# Patient Record
Sex: FEMALE | Race: OTHER | NOT HISPANIC OR LATINO
[De-identification: names, ages, dates, MRNs, and addresses within clinical notes are randomized per-mention and may not be internally consistent; named-entity substitution may affect disease eponyms.]

---

## 2019-02-19 PROBLEM — Z00.00 ENCOUNTER FOR PREVENTIVE HEALTH EXAMINATION: Status: ACTIVE | Noted: 2019-02-19

## 2019-02-21 ENCOUNTER — NON-APPOINTMENT (OUTPATIENT)
Age: 34
End: 2019-02-21

## 2019-02-21 ENCOUNTER — APPOINTMENT (OUTPATIENT)
Dept: CARDIOLOGY | Facility: CLINIC | Age: 34
End: 2019-02-21
Payer: COMMERCIAL

## 2019-02-21 ENCOUNTER — APPOINTMENT (OUTPATIENT)
Dept: NEUROLOGY | Facility: CLINIC | Age: 34
End: 2019-02-21
Payer: COMMERCIAL

## 2019-02-21 ENCOUNTER — APPOINTMENT (OUTPATIENT)
Dept: PAIN MANAGEMENT | Facility: CLINIC | Age: 34
End: 2019-02-21
Payer: COMMERCIAL

## 2019-02-21 VITALS
WEIGHT: 160 LBS | SYSTOLIC BLOOD PRESSURE: 120 MMHG | HEART RATE: 77 BPM | BODY MASS INDEX: 28.35 KG/M2 | DIASTOLIC BLOOD PRESSURE: 70 MMHG | HEIGHT: 63 IN

## 2019-02-21 VITALS
HEIGHT: 63 IN | SYSTOLIC BLOOD PRESSURE: 98 MMHG | BODY MASS INDEX: 28.35 KG/M2 | DIASTOLIC BLOOD PRESSURE: 60 MMHG | WEIGHT: 160 LBS

## 2019-02-21 VITALS
TEMPERATURE: 98.1 F | DIASTOLIC BLOOD PRESSURE: 85 MMHG | SYSTOLIC BLOOD PRESSURE: 117 MMHG | HEART RATE: 75 BPM | BODY MASS INDEX: 28.35 KG/M2 | WEIGHT: 160 LBS | HEIGHT: 63 IN

## 2019-02-21 DIAGNOSIS — G43.909 MIGRAINE, UNSPECIFIED, NOT INTRACTABLE, W/OUT STATUS MIGRAINOSUS: ICD-10-CM

## 2019-02-21 DIAGNOSIS — Z78.9 OTHER SPECIFIED HEALTH STATUS: ICD-10-CM

## 2019-02-21 DIAGNOSIS — Z83.3 FAMILY HISTORY OF DIABETES MELLITUS: ICD-10-CM

## 2019-02-21 DIAGNOSIS — Z72.3 LACK OF PHYSICAL EXERCISE: ICD-10-CM

## 2019-02-21 PROCEDURE — 99204 OFFICE O/P NEW MOD 45 MIN: CPT

## 2019-02-21 NOTE — REASON FOR VISIT
[Initial Visit] : an initial pain management visit [FreeTextEntry2] : referred by Dr. Parnell for evaluation of headache

## 2019-02-21 NOTE — ASSESSMENT
[FreeTextEntry1] : Headache likely postdural puncture refractory to conservative treatments, severely debilitating.  I explained the progression and natural course of postural puncture headaches including conservative treatments that can result in self-resolution, however given debilitating and severity of headache, patient would like to choose interventional treatment.  \par \par Patient will see Dr. Deras regarding continued workup of possible neurologic pathology.  Pending neurologic evaluation,\par \par I will schedule lumbar epidural blood patch.  r/b/a discussed including worsening headache, infection, bleeding, neuropathy, severe back pain\par \par Encouraged hydration and symptomatic treatment\par \par \par The above diagnosis and treatment plan is medically reasonable and necessary based on the patient encounter.\par \par There were no barriers to communication.\par Informed patient that I would be available for any additional questions.\par Patient was instructed to call with any worsening symptoms including severe pain, new numbness/weakness, or changes in the bowel/bladder function. \par \par Discussed role of nsaids in pain management and all relevant risks, if patient is continuing to require after 4 weeks the patient should f/u for alternative treatment. \par \par Instructed patient to maintain pain diary to monitor pain level, mobility, and function.\par \par The referring provider was informed of the above diagnosis and treatment plan.\par \par

## 2019-02-21 NOTE — PHYSICAL EXAM
[UE/LE] : Sensory: Intact in bilateral upper & lower extremities [ALL] : dorsalis pedis, posterior tibial, femoral, popliteal, and radial 2+ and symmetric bilaterally [Cranial Nerves Optic (II)] : visual acuity intact bilaterally,  visual fields full to confrontation, pupils equal round and reactive to light [Cranial Nerves Oculomotor (III)] : extraocular motion intact [Cranial Nerves Trigeminal (V)] : facial sensation intact symmetrically [Cranial Nerves Facial (VII)] : face symmetrical [Cranial Nerves Vestibulocochlear (VIII)] : hearing was intact bilaterally [Cranial Nerves Glossopharyngeal (IX)] : tongue and palate midline [Cranial Nerves Accessory (XI - Cranial And Spinal)] : head turning and shoulder shrug symmetric [Cranial Nerves Hypoglossal (XII)] : there was no tongue deviation with protrusion [Normal] : Normal affect [Spurling] : negative Spurling's Test

## 2019-02-21 NOTE — HISTORY OF PRESENT ILLNESS
[FreeTextEntry1] : Ref: Dr. Burger\par \par 32-year-old female with no significant past medical history who presented early February 2019 with an upper respiratory infection after her son had infection. The patient's history was complicated with the worst headache of her life and she had 3 separate episodes of syncope. She ultimately went to the emergency room where she underwent a head CT which was reportedly normal as well as a spinal tap to rule out meningitis. Patient states that there was no meningitis found and after that patient had severe headaches worse with standing up improved with lying down. She also had an oily yellow discharge from her nose. She denied any blurry vision or stiff neck.

## 2019-02-21 NOTE — HISTORY OF PRESENT ILLNESS
[Headache] : headache [___ days] : [unfilled] day(s) ago [10] : a maximum pain level of 10/10 [Sharp] : sharp [Aching] : aching [Laying] : laying [FreeTextEntry1] : HPI\par \par Ms. IQRA SHAFFER is a 33 year F otherwise healthy with no history of headache disorder, who experienced URI symptoms 2 weeks ago with sinus pain, and headache, started on abx but then discontinued because it was thought to be viral.  She stated that she had some nasal discharge and episode of syncope and was brought to ED 2/16/19 and had LP performed, which was negative for intra cranial pathology including SAH.  However since 2/17/19 she has been unable to stand up because of significant bilateral frontal headache radiating to occiput and neck, + photosensitivity.  Headache is positional, patient is unable to sit/stand up because of the headache and improves with lying supine.  She has taken tylenol with mild relief, but has not trailed any other mediations.  Also complains of mild left otalgia\par \par denies any worsening numbness, weakness, bowel/bladder dysfunction, denies fevers\par  [FreeTextEntry3] : staying in upright position

## 2019-02-21 NOTE — PHYSICAL EXAM
[General Appearance - Well Developed] : well developed [Normal Appearance] : normal appearance [Well Groomed] : well groomed [General Appearance - Well Nourished] : well nourished [No Deformities] : no deformities [Normal Conjunctiva] : the conjunctiva exhibited no abnormalities [Eyelids - No Xanthelasma] : the eyelids demonstrated no xanthelasmas [Normal Oral Mucosa] : normal oral mucosa [No Oral Pallor] : no oral pallor [No Oral Cyanosis] : no oral cyanosis [Normal Jugular Venous A Waves Present] : normal jugular venous A waves present [Normal Jugular Venous V Waves Present] : normal jugular venous V waves present [No Jugular Venous Lopez A Waves] : no jugular venous lopez A waves [Heart Rate And Rhythm] : heart rate and rhythm were normal [Heart Sounds] : normal S1 and S2 [Murmurs] : no murmurs present [Respiration, Rhythm And Depth] : normal respiratory rhythm and effort [Exaggerated Use Of Accessory Muscles For Inspiration] : no accessory muscle use [Auscultation Breath Sounds / Voice Sounds] : lungs were clear to auscultation bilaterally [Abdomen Soft] : soft [Abdomen Tenderness] : non-tender [Abdomen Mass (___ Cm)] : no abdominal mass palpated [Abnormal Walk] : normal gait [Gait - Sufficient For Exercise Testing] : the gait was sufficient for exercise testing [Nail Clubbing] : no clubbing of the fingernails [Cyanosis, Localized] : no localized cyanosis [Petechial Hemorrhages (___cm)] : no petechial hemorrhages [Skin Color & Pigmentation] : normal skin color and pigmentation [] : no rash [No Venous Stasis] : no venous stasis [Skin Lesions] : no skin lesions [No Skin Ulcers] : no skin ulcer [No Xanthoma] : no  xanthoma was observed [Oriented To Time, Place, And Person] : oriented to person, place, and time [Affect] : the affect was normal [Mood] : the mood was normal [No Anxiety] : not feeling anxious [FreeTextEntry1] : moderate headache.

## 2019-02-21 NOTE — ASSESSMENT
[FreeTextEntry1] : 2/21/19 EKG NSR ic RBBB, short [place] - no WPW. \par \par 32-year-old female with recurrent syncope with worst headache of her life.\par Recommend echocardiogram\par Zio patch to evaluate for arrhythmias\par Syncope likely due to vasovagal syncope\par Recommend neurology followup\par We'll contact pain management for blood patch as patient has clinical signs or symptoms of complications of spinal tap.

## 2019-02-22 PROBLEM — G43.909 HEADACHE, MIGRAINE: Status: RESOLVED | Noted: 2019-02-21 | Resolved: 2019-02-22

## 2019-02-22 NOTE — ASSESSMENT
[FreeTextEntry1] : Unclear etiology to the unusual headache she experienced.  No further episodes.  Will do imaging to reassess.\par She also has a post-dural puncture headache- asymptomatic when lying down- encouraged drinking caffeine and water and bed rest. If no relief by Monday- she will see Dr. Spear at [facility] for blood patch.\par RTC two weeks to reassess symptoms.

## 2019-02-22 NOTE — PHYSICAL EXAM
[FreeTextEntry1] : Physical examination \par General: No acute distress, Awake, Alert. \par Fundus: disc margins sharp. \par Neck: no Carotid bruit. \par Cardiovascular: Normal rate, Regular rhythm, No murmur. \par Chest - clear bilaterally\par \par Mental status \par Awake, alert, and oriented to person, time and place, Normal attention span and concentration, Recent and remote memory intact, Language intact, Fund of knowledge intact. \par Cranial Nerves \par II: VFF \par III, IV, VI: PERRL, EOMI. \par V: Facial sensation is normal B/L. \par VII: Facial strength is normal B/L. \par VIII: Gross hearing is intact. \par IX, X: Palate is midline and elevates symmetrically. \par XI: Trapezius normal strength. \par XII: Tongue midline without atrophy or fasciculations. \par \par Motor exam \par Muscle tone - no evidence of rigidity or resistance in all 4 extremities. \par No atrophy or fasciculations \par Muscle Strength: arms and legs, proximal and distal flexors and extensors are normal \par No UE drift.\par \par Reflexes \par All present, normal, and symmetrical. \par \par Coordination \par Finger to nose: Normal. \par Heel to shin: Normal. \par \par Sensory \par Intact sensation to vibration and cold.\par  \par Gait \par Normal including heels, toes, and tandem gait. \par  \par \par

## 2019-02-22 NOTE — HISTORY OF PRESENT ILLNESS
[FreeTextEntry1] : This is a 33 year old right handed woman who is being seen in neurologic consultation for evaluation of headaches.  Patient has no history of severe headaches.  Had URI in early February.  Had some headaches with this.  On Monday 2/11 experienced the worst headache of her life.  Couldn't talk or move- improved by the time she saw her PCP in the afternoon.  She was improved and rested at home.  On 2/16/19- she noted an oily, yellowish discharge from her right nostril- She tried to clean out her nose and had a vasovagal episode where she lost consciousness and fell hitting her head.  She was able to call a 911 once she gained awareness.  Went to ED - CT head was negative.  She had lumbar puncture which was unrevealing.  \par Now she is experiencing a severe headache when she has been upright for 20 minutes - this has been since the lumbar puncture.  When she lies down the headache resolves.  No focal or lateralizing signs with either type of headache.

## 2019-02-23 ENCOUNTER — RESULT REVIEW (OUTPATIENT)
Age: 34
End: 2019-02-23

## 2019-02-25 ENCOUNTER — MESSAGE (OUTPATIENT)
Age: 34
End: 2019-02-25

## 2019-02-27 ENCOUNTER — APPOINTMENT (OUTPATIENT)
Dept: NEUROLOGY | Facility: CLINIC | Age: 34
End: 2019-02-27
Payer: COMMERCIAL

## 2019-02-27 VITALS
WEIGHT: 160 LBS | TEMPERATURE: 97.1 F | HEART RATE: 81 BPM | BODY MASS INDEX: 28.35 KG/M2 | DIASTOLIC BLOOD PRESSURE: 75 MMHG | HEIGHT: 63 IN | SYSTOLIC BLOOD PRESSURE: 112 MMHG

## 2019-02-27 PROCEDURE — 99213 OFFICE O/P EST LOW 20 MIN: CPT

## 2019-02-27 NOTE — HISTORY OF PRESENT ILLNESS
[FreeTextEntry1] : 2/27/19\par She continues to have positional headache after she has been upright for more than 20 minutes.  Fluid and caffeine have not helped.  No lateralizing deficits.  Went to work today and could not stay because headache was very intense. MRI brain is normal.  She was supposed to get a blood patch on Monday but noted to have a fever so it was cancelled.  Saw ENT and she is taking ear drops for right ear infection.\par \par 2/21/19\par This is a 33 year old right handed woman who is being seen in neurologic consultation for evaluation of headaches.  Patient has no history of severe headaches.  Had URI in early February.  Had some headaches with this.  On Monday 2/11 experienced the worst headache of her life.  Couldn't talk or move- improved by the time she saw her PCP in the afternoon.  She was improved and rested at home.  On 2/16/19- she noted an oily, yellowish discharge from her right nostril- She tried to clean out her nose and had a vasovagal episode where she lost consciousness and fell hitting her head.  She was able to call a 911 once she gained awareness.  Went to ED - CT head was negative.  She had lumbar puncture which was unrevealing.  \par Now she is experiencing a severe headache when she has been upright for 20 minutes - this has been since the lumbar puncture.  When she lies down the headache resolves.  No focal or lateralizing signs with either type of headache.

## 2019-02-27 NOTE — REASON FOR VISIT
[Follow-Up: _____] : a [unfilled] follow-up visit [Acute] : an acute visit [FreeTextEntry1] : ER FOLLOW UP FEELS,TIRED,DIZZINESS

## 2019-02-27 NOTE — ASSESSMENT
[FreeTextEntry1] : Unclear etiology to the unusual headache she experienced prior to blood patch.  No further episodes- most likely viral etiology.  MRI brain is normal.\par She continues to have a post-dural puncture headache- asymptomatic when lying down- encouraged drinking caffeine and water and bed rest. Spoke to Dr. Spear- she will be getting a blood patch tomorrow.  \par She will see me as needed.\par

## 2019-02-27 NOTE — PHYSICAL EXAM
[FreeTextEntry1] : Neck: no Carotid bruit. \par Cardiovascular: Normal rate, Regular rhythm, No murmur. \par Chest - clear bilaterally\par \par Mental status \par Awake, alert, and oriented to person, time and place, Normal attention span and concentration, Recent and remote memory intact, Language intact, Fund of knowledge intact. \par Cranial Nerves \par II: VFF \par III, IV, VI: PERRL, EOMI. \par V: Facial sensation is normal B/L. \par VII: Facial strength is normal B/L. \par VIII: Gross hearing is intact. \par IX, X: Palate is midline and elevates symmetrically. \par XI: Trapezius normal strength. \par XII: Tongue midline without atrophy or fasciculations. \par \par Motor exam \par Muscle tone - no evidence of rigidity or resistance in all 4 extremities. \par No atrophy or fasciculations \par Muscle Strength: arms and legs, proximal and distal flexors and extensors are normal \par No UE drift.\par \par Reflexes \par All present, normal, and symmetrical. \par  \par Gait \par Normal including heels, toes, and tandem gait. \par  \par \par

## 2019-02-28 ENCOUNTER — APPOINTMENT (OUTPATIENT)
Dept: PAIN MANAGEMENT | Facility: HOSPITAL | Age: 34
End: 2019-02-28

## 2019-03-12 ENCOUNTER — APPOINTMENT (OUTPATIENT)
Dept: NEUROLOGY | Facility: CLINIC | Age: 34
End: 2019-03-12

## 2019-03-15 ENCOUNTER — APPOINTMENT (OUTPATIENT)
Dept: CARDIOLOGY | Facility: CLINIC | Age: 34
End: 2019-03-15

## 2019-04-08 ENCOUNTER — APPOINTMENT (OUTPATIENT)
Dept: CARDIOLOGY | Facility: CLINIC | Age: 34
End: 2019-04-08
Payer: COMMERCIAL

## 2019-04-08 VITALS
DIASTOLIC BLOOD PRESSURE: 80 MMHG | SYSTOLIC BLOOD PRESSURE: 110 MMHG | HEART RATE: 75 BPM | WEIGHT: 165 LBS | HEIGHT: 63 IN | BODY MASS INDEX: 29.23 KG/M2

## 2019-04-08 PROCEDURE — 99213 OFFICE O/P EST LOW 20 MIN: CPT

## 2019-04-13 ENCOUNTER — OTHER (OUTPATIENT)
Age: 34
End: 2019-04-13

## 2019-04-15 NOTE — HISTORY OF PRESENT ILLNESS
[FreeTextEntry1] : Ref: Dr. Burger\par \par 32-year-old female with no significant past medical history who presented early February 2019 with an upper respiratory infection after her son had infection. The patient's history was complicated with the worst headache of her life and she had 3 separate episodes of syncope. She ultimately went to the emergency room where she underwent a head CT which was reportedly normal as well as a spinal tap to rule out meningitis. Patient states that there was no meningitis found and after that patient had severe headaches worse with standing up improved with lying down. She also had an oily yellow discharge from her nose. She denied any blurry vision or stiff neck.\par \par Since last visit her nasal discharge has improved she denies any blurry vision she does not have any further back pain due to fevers she did not receive a blood patch. Patient did not have her echocardiogram done she did not send in her outpatient cardiac monitor ZIO patch patient denies any chest pain

## 2019-04-15 NOTE — ASSESSMENT
[FreeTextEntry1] : 2/21/19 EKG NSR ic RBBB, short [place] - no WPW. \par \par 32-year-old female with recurrent syncope with worst headache of her life.\par Recommend echocardiogram - pt has yet to do this\par Zio patch to evaluate for arrhythmias - pt has yet to send it back in. \par Syncope likely due to vasovagal syncope - no further episode of syncope. \par Recommend neurology followup\par We'll contact pain management for blood patch as patient has clinical signs or symptoms of complications of spinal tap. - this was deferred due to fevers.

## 2019-04-15 NOTE — PHYSICAL EXAM
[General Appearance - Well Developed] : well developed [Normal Appearance] : normal appearance [Well Groomed] : well groomed [General Appearance - Well Nourished] : well nourished [No Deformities] : no deformities [Normal Conjunctiva] : the conjunctiva exhibited no abnormalities [Eyelids - No Xanthelasma] : the eyelids demonstrated no xanthelasmas [Normal Oral Mucosa] : normal oral mucosa [No Oral Pallor] : no oral pallor [No Oral Cyanosis] : no oral cyanosis [Normal Jugular Venous A Waves Present] : normal jugular venous A waves present [Normal Jugular Venous V Waves Present] : normal jugular venous V waves present [No Jugular Venous Lopez A Waves] : no jugular venous lopez A waves [Respiration, Rhythm And Depth] : normal respiratory rhythm and effort [Exaggerated Use Of Accessory Muscles For Inspiration] : no accessory muscle use [Auscultation Breath Sounds / Voice Sounds] : lungs were clear to auscultation bilaterally [Heart Rate And Rhythm] : heart rate and rhythm were normal [Heart Sounds] : normal S1 and S2 [Murmurs] : no murmurs present [Abdomen Soft] : soft [Abdomen Tenderness] : non-tender [Abdomen Mass (___ Cm)] : no abdominal mass palpated [Abnormal Walk] : normal gait [Gait - Sufficient For Exercise Testing] : the gait was sufficient for exercise testing [Nail Clubbing] : no clubbing of the fingernails [Cyanosis, Localized] : no localized cyanosis [Petechial Hemorrhages (___cm)] : no petechial hemorrhages [Skin Color & Pigmentation] : normal skin color and pigmentation [] : no rash [No Venous Stasis] : no venous stasis [Skin Lesions] : no skin lesions [No Skin Ulcers] : no skin ulcer [No Xanthoma] : no  xanthoma was observed [Oriented To Time, Place, And Person] : oriented to person, place, and time [Affect] : the affect was normal [Mood] : the mood was normal [No Anxiety] : not feeling anxious [FreeTextEntry1] : moderate headache.

## 2019-05-14 ENCOUNTER — APPOINTMENT (OUTPATIENT)
Dept: CARDIOLOGY | Facility: CLINIC | Age: 34
End: 2019-05-14
Payer: COMMERCIAL

## 2019-05-14 VITALS
SYSTOLIC BLOOD PRESSURE: 120 MMHG | WEIGHT: 165 LBS | DIASTOLIC BLOOD PRESSURE: 80 MMHG | BODY MASS INDEX: 29.23 KG/M2 | HEART RATE: 86 BPM | HEIGHT: 63 IN

## 2019-05-14 PROCEDURE — 99213 OFFICE O/P EST LOW 20 MIN: CPT

## 2019-05-14 NOTE — PHYSICAL EXAM
[General Appearance - Well Developed] : well developed [Normal Appearance] : normal appearance [Well Groomed] : well groomed [General Appearance - Well Nourished] : well nourished [No Deformities] : no deformities [Normal Conjunctiva] : the conjunctiva exhibited no abnormalities [Eyelids - No Xanthelasma] : the eyelids demonstrated no xanthelasmas [Normal Oral Mucosa] : normal oral mucosa [No Oral Pallor] : no oral pallor [No Oral Cyanosis] : no oral cyanosis [Normal Jugular Venous A Waves Present] : normal jugular venous A waves present [Normal Jugular Venous V Waves Present] : normal jugular venous V waves present [No Jugular Venous Lopez A Waves] : no jugular venous lopez A waves [Respiration, Rhythm And Depth] : normal respiratory rhythm and effort [Exaggerated Use Of Accessory Muscles For Inspiration] : no accessory muscle use [Heart Rate And Rhythm] : heart rate and rhythm were normal [Auscultation Breath Sounds / Voice Sounds] : lungs were clear to auscultation bilaterally [Murmurs] : no murmurs present [Heart Sounds] : normal S1 and S2 [Abdomen Soft] : soft [Abdomen Tenderness] : non-tender [Abdomen Mass (___ Cm)] : no abdominal mass palpated [Gait - Sufficient For Exercise Testing] : the gait was sufficient for exercise testing [Abnormal Walk] : normal gait [Cyanosis, Localized] : no localized cyanosis [Nail Clubbing] : no clubbing of the fingernails [Petechial Hemorrhages (___cm)] : no petechial hemorrhages [Skin Color & Pigmentation] : normal skin color and pigmentation [] : no rash [No Venous Stasis] : no venous stasis [Skin Lesions] : no skin lesions [No Skin Ulcers] : no skin ulcer [No Xanthoma] : no  xanthoma was observed [Oriented To Time, Place, And Person] : oriented to person, place, and time [Affect] : the affect was normal [No Anxiety] : not feeling anxious [Mood] : the mood was normal [FreeTextEntry1] : moderate headache.

## 2019-05-14 NOTE — HISTORY OF PRESENT ILLNESS
[FreeTextEntry1] : Ref: Dr. Burger\par \par 32-year-old female with no significant past medical history who presented early February 2019 with an upper respiratory infection after her son had infection. The patient's history was complicated with the worst headache of her life and she had 3 separate episodes of syncope. She ultimately went to the emergency room where she underwent a head CT which was reportedly normal as well as a spinal tap to rule out meningitis. Patient states that there was no meningitis found and after that patient had severe headaches worse with standing up improved with lying down. She also had an oily yellow discharge from her nose. She denied any blurry vision or stiff neck.\par \par  her nasal discharge has improved she denies any blurry vision she does not have any further back pain due to fevers she did not receive a blood patch. Patient did not have her echocardiogram done she did not send in her outpatient cardiac monitor ZIO patch patient denies any chest pain\par \par Since last visit - no further syncope.

## 2019-05-14 NOTE — ASSESSMENT
[FreeTextEntry1] : 2/21/19 EKG NSR ic RBBB, short [place] - no WPW. \par \par 32-year-old female with recurrent syncope with worst headache of her life. No recent syncope\par echocardiogram - Ef normal Ejection fraction 68%\par Zio patch to evaluate for arrhythmias - pt has yet to send it back in. \par Syncope likely due to vasovagal syncope - no further episode of syncope. \par Recommend neurology followup\par blood patch deferred due to fevers. \par pt may follow up prn. \par pt to call in for results of zio.

## 2019-07-03 ENCOUNTER — APPOINTMENT (OUTPATIENT)
Dept: CARDIOLOGY | Facility: CLINIC | Age: 34
End: 2019-07-03
Payer: COMMERCIAL

## 2019-07-03 ENCOUNTER — NON-APPOINTMENT (OUTPATIENT)
Age: 34
End: 2019-07-03

## 2019-07-03 VITALS — DIASTOLIC BLOOD PRESSURE: 60 MMHG | HEART RATE: 80 BPM | SYSTOLIC BLOOD PRESSURE: 94 MMHG

## 2019-07-03 PROCEDURE — 99213 OFFICE O/P EST LOW 20 MIN: CPT

## 2019-07-03 PROCEDURE — 93000 ELECTROCARDIOGRAM COMPLETE: CPT

## 2019-07-03 RX ORDER — PROCHLORPERAZINE MALEATE 5 MG/1
5 TABLET ORAL
Qty: 12 | Refills: 0 | Status: DISCONTINUED | COMMUNITY
Start: 2019-02-19

## 2019-07-03 RX ORDER — CLINDAMYCIN PHOSPHATE 20 MG/G
2 CREAM VAGINAL
Qty: 40 | Refills: 0 | Status: DISCONTINUED | COMMUNITY
Start: 2019-01-14

## 2019-07-03 RX ORDER — DOXYCYCLINE HYCLATE 100 MG/1
100 TABLET ORAL
Qty: 20 | Refills: 0 | Status: DISCONTINUED | COMMUNITY
Start: 2019-02-11

## 2019-07-03 RX ORDER — FLUTICASONE PROPIONATE 50 UG/1
50 SPRAY, METERED NASAL
Qty: 16 | Refills: 0 | Status: DISCONTINUED | COMMUNITY
Start: 2019-02-11

## 2019-07-03 RX ORDER — FLUCONAZOLE 150 MG/1
150 TABLET ORAL
Qty: 2 | Refills: 0 | Status: DISCONTINUED | COMMUNITY
Start: 2019-01-14

## 2019-07-03 NOTE — REASON FOR VISIT
[FreeTextEntry1] : Called office with c/o intermittent chest discomfort over last few days, a non exertional heaviness that lasted a few hours when she had it.  Pain was relieved after taking Aleve yesterday, today there has been no pain.  She presents for evaluation\par \par History of syncope- none recent

## 2019-07-03 NOTE — REVIEW OF SYSTEMS
[Negative] : Musculoskeletal [Recent Weight Gain (___ Lbs)] : no recent weight gain [Recent Weight Loss (___ Lbs)] : no recent weight loss [Shortness Of Breath] : no shortness of breath [Dyspnea on exertion] : not dyspnea during exertion [Lower Ext Edema] : no extremity edema [Cough] : no cough [Palpitations] : no palpitations [Easy Bruising] : no tendency for easy bruising [Easy Bleeding] : no tendency for easy bleeding

## 2019-07-03 NOTE — HISTORY OF PRESENT ILLNESS
[FreeTextEntry1] : \par \par 32-year-old female with no significant past medical history who presented early February 2019 with an upper respiratory infection after her son had infection. The patient's history was complicated with the worst headache of her life and she had 3 separate episodes of syncope. She ultimately went to the emergency room where she underwent a head CT which was reportedly normal as well as a spinal tap to rule out meningitis. Patient states that there was no meningitis found and after that patient had severe headaches worse with standing up improved with lying down. She also had an oily yellow discharge from her nose. She denied any blurry vision or stiff neck.\par her nasal discharge has improved she denies any blurry vision she does not have any further back pain

## 2019-07-03 NOTE — PHYSICAL EXAM
[General Appearance - In No Acute Distress] : no acute distress [Normal Appearance] : normal appearance [Auscultation Breath Sounds / Voice Sounds] : lungs were clear to auscultation bilaterally [Respiration, Rhythm And Depth] : normal respiratory rhythm and effort [Heart Sounds] : normal S1 and S2 [Edema] : no peripheral edema present [Skin Color & Pigmentation] : normal skin color and pigmentation [Abnormal Walk] : normal gait

## 2019-07-03 NOTE — ASSESSMENT
[FreeTextEntry1] : atypical chest discomfort, relieved with Aleve.  Doubtful cardiac in nature.  Patient reassured.  If reoccurs can schedule stress test

## 2019-10-03 ENCOUNTER — APPOINTMENT (OUTPATIENT)
Dept: OBGYN | Facility: CLINIC | Age: 34
End: 2019-10-03

## 2020-10-02 ENCOUNTER — APPOINTMENT (OUTPATIENT)
Dept: GASTROENTEROLOGY | Facility: CLINIC | Age: 35
End: 2020-10-02
Payer: COMMERCIAL

## 2020-10-02 VITALS
HEART RATE: 72 BPM | HEIGHT: 63 IN | WEIGHT: 190 LBS | OXYGEN SATURATION: 98 % | TEMPERATURE: 99 F | SYSTOLIC BLOOD PRESSURE: 110 MMHG | BODY MASS INDEX: 33.66 KG/M2 | DIASTOLIC BLOOD PRESSURE: 60 MMHG

## 2020-10-02 PROCEDURE — 99203 OFFICE O/P NEW LOW 30 MIN: CPT

## 2020-10-02 NOTE — HISTORY OF PRESENT ILLNESS
[FreeTextEntry1] : 35f no sig PMH presents for diarrhea that started 4 wks ago- originally loose, 3-4x/d.  Nonbloody, no fever, mild lower abd pains relieved by BMs.  Had stools tested and reportedly neg for infx; took several days of cipro - slowly sx have improved.  Pt states stool more formed now, but soft, yellower in color, once daily.  Pain resolved.  No other abx, travel.  \par \par Soc:  no tobacco or significant EtOH\par FHx: no FHx GI malignancy or IBD\par \par ROS:\par Constitutional:: no weight loss, fevers\par ENT: no deafness\par Eyes: not blind\par Neck: no LN\par Chest: no dyspnea/cough\par Cardiac: no chest pain\par Vascular: no leg swelling\par GI: no abdominal pain, nausea, vomiting, diarrhea, constipation, rectal bleeding, dysphagia, melena unless otherwise noted in HPI\par : no dysuria, dark urine\par Skin: no rashes, jaundice\par Heme: no bleeding\par Endocrine: no DM unless otherwise stated in HPI\par \par Px: (VS noted below)\par General: NAD\par Eyes: anicteric\par Oropharynx:  clear\par Neck: no LN\par Chest: normal respiratory effort\par CVS: regular\par Abd: soft, NT, ND, +BS, no HSM\par Ext: no atrophy\par Neuro: grossly nonfocal\par \par Labs/imaging/prior endoscopic results reviewed to the extent available and noted in HPI\par

## 2020-10-02 NOTE — CONSULT LETTER
[FreeTextEntry1] : Dear Dr. MINOR MAY ,\par \par I had the pleasure of evaluating your patient,  IQRA SHAFEFR.\par \par Please refer to my note below.\par \par Thank you very much for allowing me to participate in the care of this patient.  If you have any questions, please do not hesitate to contact me.\par \par Sincerely, \par \par Krystian Funk MD\par

## 2020-10-02 NOTE — ASSESSMENT
[FreeTextEntry1] : seems to be resolving - possibly viral, w/ some post-infx ibs or lactose intolerance?  Seems to be nearly back to baseline.  Will defer testing at this time.  Advised pt to call if sx worsen again or if any sx persist >2-3 more weeks.  Perhaps low lactose diet for now.

## 2020-10-02 NOTE — REASON FOR VISIT
[Consultation] : a consultation visit [FreeTextEntry1] : Kindly asked by MEMO SMITH NP  to consult and evaluate patient for  diarrhea                  \par A copy of this note is being sent to physician requesting consultation.

## 2022-06-16 ENCOUNTER — APPOINTMENT (OUTPATIENT)
Dept: NEUROLOGY | Facility: CLINIC | Age: 37
End: 2022-06-16

## 2022-06-20 ENCOUNTER — TRANSCRIPTION ENCOUNTER (OUTPATIENT)
Age: 37
End: 2022-06-20

## 2022-07-08 ENCOUNTER — APPOINTMENT (OUTPATIENT)
Dept: NEUROLOGY | Facility: CLINIC | Age: 37
End: 2022-07-08

## 2022-09-26 ENCOUNTER — APPOINTMENT (OUTPATIENT)
Dept: CARDIOLOGY | Facility: CLINIC | Age: 37
End: 2022-09-26

## 2022-09-26 ENCOUNTER — NON-APPOINTMENT (OUTPATIENT)
Age: 37
End: 2022-09-26

## 2022-09-26 VITALS
OXYGEN SATURATION: 100 % | HEIGHT: 63 IN | RESPIRATION RATE: 12 BRPM | SYSTOLIC BLOOD PRESSURE: 113 MMHG | DIASTOLIC BLOOD PRESSURE: 77 MMHG | HEART RATE: 81 BPM | WEIGHT: 206 LBS | BODY MASS INDEX: 36.5 KG/M2

## 2022-09-26 DIAGNOSIS — G44.53 PRIMARY THUNDERCLAP HEADACHE: ICD-10-CM

## 2022-09-26 DIAGNOSIS — M79.18 MYALGIA, OTHER SITE: ICD-10-CM

## 2022-09-26 DIAGNOSIS — Z87.898 PERSONAL HISTORY OF OTHER SPECIFIED CONDITIONS: ICD-10-CM

## 2022-09-26 DIAGNOSIS — R07.89 OTHER CHEST PAIN: ICD-10-CM

## 2022-09-26 DIAGNOSIS — G97.1 OTHER REACTION TO SPINAL AND LUMBAR PUNCTURE: ICD-10-CM

## 2022-09-26 PROCEDURE — 93246 EXT ECG>7D<15D RECORDING: CPT

## 2022-09-26 PROCEDURE — 99204 OFFICE O/P NEW MOD 45 MIN: CPT

## 2022-09-26 NOTE — PHYSICAL EXAM
[No Acute Distress] : no acute distress [Obese] : obese [Normal Conjunctiva] : normal conjunctiva [Normal Venous Pressure] : normal venous pressure [No Carotid Bruit] : no carotid bruit [Normal S1, S2] : normal S1, S2 [No Murmur] : no murmur [No Rub] : no rub [No Gallop] : no gallop [Clear Lung Fields] : clear lung fields [Good Air Entry] : good air entry [No Respiratory Distress] : no respiratory distress  [No Masses/organomegaly] : no masses/organomegaly [Normal Bowel Sounds] : normal bowel sounds [No Edema] : no edema [No Cyanosis] : no cyanosis [No Clubbing] : no clubbing [Moves all extremities] : moves all extremities [No Focal Deficits] : no focal deficits [Normal Speech] : normal speech [Alert and Oriented] : alert and oriented [Normal memory] : normal memory

## 2022-09-26 NOTE — HISTORY OF PRESENT ILLNESS
[FreeTextEntry1] : 36 yo female who presents today for cardiac evaluation of recurrent dizziness/syncope. She was recently evaluated at [facility] on 9/14/22 for dizziness which occurred when getting up after sitting down for ~ 10 minutes. ECG and labs in ER were unremarkable except for Hgb 10. She reports having syncope in 5/2022 while she was pregnant with twins. She has been having these episodes/symptoms over the years and underwent echo in 2019, which was unremarkable study. Patient denies chest pain, dyspnea, palpitations, edema, melena, hematochezia, or hematemesis. She reports having syncope in the past from seeing blood after cutting her finger. She also reports that she probably does not drink enough fluids throughout the day.\par \par Primary: Orlando Burger ([facility])

## 2022-09-26 NOTE — ASSESSMENT
[FreeTextEntry1] : 36 yo female with recurrent dizziness/syncope. \par ECG and labs (CBC, CMP) from [facility] on 9/14/22 were reviewed today and found to be unremarkable except for anemia with Hgb 10.\par Brain MRI report from 2/2022 was reviewed and negative for acute intracranial findings.\par Echo from 4/30/19 reviewed today and reported normal LV size and systolic function, LVEF 68%, and trace MR\par \par Zio XT monitor was placed in the office today to monitor for arrhythmias over the next 2 weeks.\par Pending review of monitor results, will determine if further cardiac work-up or intervention is clinically indicated.\par Suspect that patient is having vasovagal syncope +/- orthostatic component.\par Patient was advised to try to avoid potential triggers for vasovagal syncope and to also ensure adequate fluid intake.

## 2022-09-26 NOTE — CARDIOLOGY SUMMARY
[de-identified] : \par 9/14/22 ECG (at [facility]): Sinus, rate 65 bpm, incomplete RBBB\par  [de-identified] : \par 4/30/19 Echo (at [facility]): Normal LV size and systolic function, LVEF 68%. Trace MR\par

## 2022-10-26 DIAGNOSIS — R42 DIZZINESS AND GIDDINESS: ICD-10-CM

## 2022-10-26 DIAGNOSIS — R55 SYNCOPE AND COLLAPSE: ICD-10-CM

## 2022-10-26 PROCEDURE — 93248 EXT ECG>7D<15D REV&INTERPJ: CPT

## 2022-10-27 ENCOUNTER — NON-APPOINTMENT (OUTPATIENT)
Age: 37
End: 2022-10-27

## 2023-05-31 ENCOUNTER — APPOINTMENT (OUTPATIENT)
Dept: OBGYN | Facility: CLINIC | Age: 38
End: 2023-05-31
Payer: MEDICAID

## 2023-05-31 VITALS
SYSTOLIC BLOOD PRESSURE: 120 MMHG | WEIGHT: 227 LBS | BODY MASS INDEX: 40.22 KG/M2 | HEIGHT: 63 IN | DIASTOLIC BLOOD PRESSURE: 80 MMHG

## 2023-05-31 PROCEDURE — 76857 US EXAM PELVIC LIMITED: CPT

## 2023-05-31 PROCEDURE — 99214 OFFICE O/P EST MOD 30 MIN: CPT

## 2023-05-31 RX ADMIN — METHYLERGONOVINE MALEATE 0.2 MG/ML: 0.2 INJECTION INTRAVENOUS at 00:00

## 2023-05-31 RX ADMIN — KETOROLAC TROMETHAMINE 0 MG/ML: 30 INJECTION, SOLUTION INTRAMUSCULAR; INTRAVENOUS at 00:00

## 2023-05-31 NOTE — COUNSELING
[Nutrition] : nutrition [Vulvar Hygiene] : vulvar hygiene [Pre/Post Op Instructions] : pre/post op instructions

## 2023-05-31 NOTE — PHYSICAL EXAM
[Chaperone Present] : A chaperone was present in the examining room during all aspects of the physical examination [FreeTextEntry1] : Marie Rangel and Rylee Cook [Labia Majora] : labia major [Normal] : clitoris [Moderate] : there was moderate vaginal bleeding [Normal Position] : in a normal position [Tenderness] : nontender [Enlarged ___ wks] : enlarged [unfilled] ~Uweeks [Uterine Adnexae] : were not tender and not enlarged [FreeTextEntry5] : tissue pulled from the os and sent to pathology

## 2023-05-31 NOTE — CHIEF COMPLAINT
[Urgent Visit] : Urgent Visit [FreeTextEntry1] : 38 yr old @ 8 weeks amenorrheic was seen in the ER 2023 and diagnosed with a missed .  Patient was given 800mcg Cytotec and sent home to follow up in the office.  Patient states she had some bleeding but not much.\par \par RH- positive\par \par Sono 23\par Uterus:\par gestational sac containing a yolk sac is noted in the lower uterine segment/cervix. Fetal pole is no longer identified.\par \par Right ovary: 2.8 x 2.5 x 2.5 cm. Within normal limits.\par Left ovary: Not visualized. \par Free fluid:None.\par \par \par IMPRESSION: Findings consistent with nonviable gestation.\par

## 2023-05-31 NOTE — PROCEDURE
[F/U Medical ] : f/u medical  [Transvaginal Ultrasound] : transvaginal ultrasound [L: ___ cm] : L: [unfilled] cm [W: ___cm] : W: [unfilled] cm [H: ___ cm] : H: [unfilled] cm [Not Visualized] : not visualized [FreeTextEntry5] : ES 11.7mm

## 2023-05-31 NOTE — HISTORY OF PRESENT ILLNESS
[Good] : being in good health [Reproductive Age] : is of reproductive age [Lower-Rt-Q] : lower right quadrant [Suprapubic] : suprapubic area [Lower-Lt-Q] : left lower quadrant [Sexually Active] : is sexually active [Male ___] : [unfilled] male

## 2023-06-06 RX ORDER — KETOROLAC TROMETHAMINE 30 MG/ML
30 INJECTION, SOLUTION INTRAMUSCULAR; INTRAVENOUS
Qty: 1 | Refills: 0 | Status: COMPLETED | OUTPATIENT
Start: 2023-05-31

## 2023-06-06 RX ORDER — METHYLERGONOVINE MALEATE 0.2 MG/ML
0.2 INJECTION, SOLUTION INTRAMUSCULAR; INTRAVENOUS
Qty: 0 | Refills: 0 | Status: COMPLETED | OUTPATIENT
Start: 2023-05-31

## 2023-06-07 ENCOUNTER — APPOINTMENT (OUTPATIENT)
Dept: OBGYN | Facility: CLINIC | Age: 38
End: 2023-06-07
Payer: MEDICAID

## 2023-06-07 ENCOUNTER — ASOB RESULT (OUTPATIENT)
Age: 38
End: 2023-06-07

## 2023-06-07 VITALS
WEIGHT: 220 LBS | BODY MASS INDEX: 38.98 KG/M2 | DIASTOLIC BLOOD PRESSURE: 78 MMHG | HEIGHT: 63 IN | SYSTOLIC BLOOD PRESSURE: 118 MMHG

## 2023-06-07 PROCEDURE — 76830 TRANSVAGINAL US NON-OB: CPT

## 2023-06-07 PROCEDURE — 99213 OFFICE O/P EST LOW 20 MIN: CPT

## 2023-06-07 NOTE — HISTORY OF PRESENT ILLNESS
[FreeTextEntry1] : 38 yr old presents for follow up after missed  1 week ago.  She was given methergine but was not able to complete the course.  Today she has scant bleeding.  Sono shows a thickened endometrial stripe.

## 2023-06-07 NOTE — PLAN
[FreeTextEntry1] : Patient to take another course of Cytotec\par Follow up in 1 week\par Expect heavier bleeding

## 2023-06-14 ENCOUNTER — ASOB RESULT (OUTPATIENT)
Age: 38
End: 2023-06-14

## 2023-06-14 ENCOUNTER — APPOINTMENT (OUTPATIENT)
Dept: OBGYN | Facility: CLINIC | Age: 38
End: 2023-06-14
Payer: MEDICAID

## 2023-06-14 VITALS
SYSTOLIC BLOOD PRESSURE: 110 MMHG | WEIGHT: 218 LBS | DIASTOLIC BLOOD PRESSURE: 70 MMHG | HEIGHT: 63 IN | BODY MASS INDEX: 38.62 KG/M2

## 2023-06-14 LAB — CORE LAB BIOPSY: NORMAL

## 2023-06-14 PROCEDURE — 76830 TRANSVAGINAL US NON-OB: CPT

## 2023-06-14 PROCEDURE — 99213 OFFICE O/P EST LOW 20 MIN: CPT

## 2023-06-14 NOTE — HISTORY OF PRESENT ILLNESS
[FreeTextEntry1] : 38 yr old presents today for follow up.  She had minimal bleeding with the last doses of Cytotec.  Patient is doing well and states the bleeding has stopped.  She is leaving tomorrow to go on vacation with her family and will be back next Friday/

## 2023-06-14 NOTE — PLAN
[FreeTextEntry1] : Review of sono today still shows a thickened lining.  \par Discussed with the patient doing a D&C\par She would like to wait until she returns from vacation\par Will plan for D&C on 6/30/23

## 2023-06-29 ENCOUNTER — APPOINTMENT (OUTPATIENT)
Age: 38
End: 2023-06-29
Payer: MEDICAID

## 2023-06-29 ENCOUNTER — ASOB RESULT (OUTPATIENT)
Age: 38
End: 2023-06-29

## 2023-06-29 ENCOUNTER — APPOINTMENT (OUTPATIENT)
Dept: OBGYN | Facility: CLINIC | Age: 38
End: 2023-06-29
Payer: MEDICAID

## 2023-06-29 VITALS
DIASTOLIC BLOOD PRESSURE: 70 MMHG | WEIGHT: 218 LBS | BODY MASS INDEX: 38.62 KG/M2 | HEIGHT: 63 IN | SYSTOLIC BLOOD PRESSURE: 120 MMHG

## 2023-06-29 DIAGNOSIS — O02.1 MISSED ABORTION: ICD-10-CM

## 2023-06-29 PROCEDURE — 76830 TRANSVAGINAL US NON-OB: CPT

## 2023-06-29 PROCEDURE — 99213 OFFICE O/P EST LOW 20 MIN: CPT

## 2023-06-29 NOTE — PHYSICAL EXAM
[Appropriately responsive] : appropriately responsive [Alert] : alert [No Acute Distress] : no acute distress [No Murmurs] : no murmurs [Soft] : soft [Non-tender] : non-tender [Oriented x3] : oriented x3

## 2023-06-29 NOTE — HISTORY OF PRESENT ILLNESS
[FreeTextEntry1] : 38-year-old multiparous patient who was seen as a follow-up from a ER visit for missed .  Patient was seen in the office as an urgent visit on May 31, 2023 at which time patient's had products at the os and these products were removed patient was given Methergine and a 1 week follow up.  On  she presented  for a follow up.  She states she was not able to take the medication and there were retained products seen and in the uterus,  a  dose of Cytotec was administered with a follow-up on the  .   visit the patient states she passed clots and tissue and ultrasound showed a thickened endometrium patient was advised that she needed to go to the operating room for a D&C.  She wanted to wait until coming back from vacation for the D&C, scheduled for 2023.   Patient called the office today stating that she had a period that started on Friday of last week and  she had heavy bleeding for 4 days.  The period ended today.  Patient advised to come in for evaluation, she is currently just spotting and denies other complaints.

## 2023-06-29 NOTE — COUNSELING
[Nutrition/ Exercise/ Weight Management] : nutrition, exercise, weight management [Contraception/ Emergency Contraception/ Safe Sexual Practices] : contraception, emergency contraception, safe sexual practices [Other ___] : [unfilled]

## 2023-06-30 ENCOUNTER — APPOINTMENT (OUTPATIENT)
Age: 38
End: 2023-06-30

## 2023-07-03 ENCOUNTER — APPOINTMENT (OUTPATIENT)
Age: 38
End: 2023-07-03

## 2023-07-06 LAB
ABO + RH PNL BLD: NORMAL
HCG SERPL-MCNC: 13 MIU/ML
HCG SERPL-MCNC: 2 MIU/ML

## 2023-10-02 ENCOUNTER — APPOINTMENT (OUTPATIENT)
Age: 38
End: 2023-10-02

## 2024-04-17 ENCOUNTER — LABORATORY RESULT (OUTPATIENT)
Age: 39
End: 2024-04-17

## 2024-04-18 ENCOUNTER — NON-APPOINTMENT (OUTPATIENT)
Age: 39
End: 2024-04-18

## 2024-04-18 ENCOUNTER — APPOINTMENT (OUTPATIENT)
Dept: HEART AND VASCULAR | Facility: CLINIC | Age: 39
End: 2024-04-18
Payer: MEDICAID

## 2024-04-18 VITALS
SYSTOLIC BLOOD PRESSURE: 120 MMHG | DIASTOLIC BLOOD PRESSURE: 78 MMHG | BODY MASS INDEX: 41.11 KG/M2 | WEIGHT: 232 LBS | HEIGHT: 63 IN

## 2024-04-18 DIAGNOSIS — R55 SYNCOPE AND COLLAPSE: ICD-10-CM

## 2024-04-18 DIAGNOSIS — R07.9 CHEST PAIN, UNSPECIFIED: ICD-10-CM

## 2024-04-18 PROCEDURE — 99214 OFFICE O/P EST MOD 30 MIN: CPT | Mod: 25

## 2024-04-18 PROCEDURE — 93000 ELECTROCARDIOGRAM COMPLETE: CPT

## 2024-04-18 RX ORDER — CHLORHEXIDINE GLUCONATE 4 %
325 (65 FE) LIQUID (ML) TOPICAL
Qty: 60 | Refills: 0 | Status: DISCONTINUED | COMMUNITY
Start: 2022-07-29 | End: 2024-04-18

## 2024-04-18 RX ORDER — MISOPROSTOL 200 UG/1
200 TABLET ORAL 3 TIMES DAILY
Qty: 12 | Refills: 0 | Status: DISCONTINUED | COMMUNITY
Start: 2023-06-07 | End: 2024-04-18

## 2024-04-18 RX ORDER — METHYLERGONOVINE MALEATE 0.2 MG/ML
0.2 INJECTION, SOLUTION INTRAMUSCULAR; INTRAVENOUS ONCE
Qty: 1 | Refills: 0 | Status: DISCONTINUED | COMMUNITY
Start: 2023-05-31 | End: 2024-04-18

## 2024-04-18 RX ORDER — METHYLERGONOVINE MALEATE 0.2 MG/1
0.2 TABLET ORAL 3 TIMES DAILY
Qty: 9 | Refills: 0 | Status: DISCONTINUED | COMMUNITY
Start: 2023-05-31 | End: 2024-04-18

## 2024-04-18 NOTE — HISTORY OF PRESENT ILLNESS
[FreeTextEntry1] : 37 YO F with no PMH, no family Hx of early CAD/stroke, Hx of near syncope likely vagal/orthostatic with a _ve Holter/TTE. Smokes marijuana daily, who is here for Lt arm and chest pain with some SOB. No MARTINEZ/palpitations. EKG is incomplete RBBB and unchanged.

## 2024-04-18 NOTE — DISCUSSION/SUMMARY
[FreeTextEntry1] : 37 YO F with no PMH, no family Hx of early CAD/stroke, Hx of near syncope likely vagal/orthostatic with a _ve Holter/TTE. Smokes marijuana daily, who is here for Lt arm and chest pain with some SOB. No MARTINEZ/palpitations. EKG is incomplete RBBB and unchanged.  TTE Stress echo  Lipid panel  Will check the calcium score depending on the lipids   The syncope episode could be due to vagal/orthostatic/marijuana use. _ve Holter before an no palpitations  [EKG obtained to assist in diagnosis and management of assessed problem(s)] : EKG obtained to assist in diagnosis and management of assessed problem(s)

## 2024-04-19 LAB
CHOLEST SERPL-MCNC: 110 MG/DL
HDLC SERPL-MCNC: 46 MG/DL
LDLC SERPL CALC-MCNC: 48 MG/DL
NONHDLC SERPL-MCNC: 64 MG/DL
TRIGL SERPL-MCNC: 74 MG/DL

## 2024-05-09 ENCOUNTER — APPOINTMENT (OUTPATIENT)
Dept: NEUROLOGY | Facility: CLINIC | Age: 39
End: 2024-05-09

## 2024-06-24 ENCOUNTER — APPOINTMENT (OUTPATIENT)
Dept: CARDIOLOGY | Facility: CLINIC | Age: 39
End: 2024-06-24

## 2024-06-24 ENCOUNTER — APPOINTMENT (OUTPATIENT)
Dept: CARDIOLOGY | Facility: CLINIC | Age: 39
End: 2024-06-24
Payer: MEDICAID

## 2024-06-24 PROCEDURE — 93015 CV STRESS TEST SUPVJ I&R: CPT

## 2024-06-24 PROCEDURE — 93306 TTE W/DOPPLER COMPLETE: CPT

## 2024-07-11 ENCOUNTER — NON-APPOINTMENT (OUTPATIENT)
Age: 39
End: 2024-07-11

## 2024-07-11 ENCOUNTER — APPOINTMENT (OUTPATIENT)
Dept: HEART AND VASCULAR | Facility: CLINIC | Age: 39
End: 2024-07-11
Payer: MEDICAID

## 2024-07-11 VITALS
HEART RATE: 89 BPM | DIASTOLIC BLOOD PRESSURE: 60 MMHG | SYSTOLIC BLOOD PRESSURE: 92 MMHG | HEIGHT: 63 IN | WEIGHT: 229 LBS | BODY MASS INDEX: 40.57 KG/M2 | OXYGEN SATURATION: 96 %

## 2024-07-11 DIAGNOSIS — R06.02 SHORTNESS OF BREATH: ICD-10-CM

## 2024-07-11 PROCEDURE — 93000 ELECTROCARDIOGRAM COMPLETE: CPT

## 2024-07-11 PROCEDURE — 99214 OFFICE O/P EST MOD 30 MIN: CPT | Mod: 25

## 2024-08-23 ENCOUNTER — APPOINTMENT (OUTPATIENT)
Dept: CARDIOLOGY | Facility: CLINIC | Age: 39
End: 2024-08-23

## 2024-08-29 ENCOUNTER — APPOINTMENT (OUTPATIENT)
Dept: HEART AND VASCULAR | Facility: CLINIC | Age: 39
End: 2024-08-29

## 2024-10-02 ENCOUNTER — APPOINTMENT (OUTPATIENT)
Dept: CARDIOLOGY | Facility: CLINIC | Age: 39
End: 2024-10-02
Payer: MEDICAID

## 2024-10-02 PROCEDURE — 93351 STRESS TTE COMPLETE: CPT

## 2024-10-02 PROCEDURE — ZZZZZ: CPT | Mod: NC

## 2024-10-16 ENCOUNTER — APPOINTMENT (OUTPATIENT)
Dept: OBGYN | Facility: CLINIC | Age: 39
End: 2024-10-16
Payer: MEDICAID

## 2024-10-16 ENCOUNTER — ASOB RESULT (OUTPATIENT)
Age: 39
End: 2024-10-16

## 2024-10-16 VITALS
SYSTOLIC BLOOD PRESSURE: 120 MMHG | BODY MASS INDEX: 39.69 KG/M2 | DIASTOLIC BLOOD PRESSURE: 70 MMHG | WEIGHT: 224 LBS | HEIGHT: 63 IN

## 2024-10-16 DIAGNOSIS — R10.2 PELVIC AND PERINEAL PAIN: ICD-10-CM

## 2024-10-16 DIAGNOSIS — N83.209 UNSPECIFIED OVARIAN CYST, UNSPECIFIED SIDE: ICD-10-CM

## 2024-10-16 PROCEDURE — 99214 OFFICE O/P EST MOD 30 MIN: CPT

## 2024-10-16 PROCEDURE — 99459 PELVIC EXAMINATION: CPT

## 2024-10-16 PROCEDURE — 76830 TRANSVAGINAL US NON-OB: CPT

## 2024-10-25 ENCOUNTER — RESULT REVIEW (OUTPATIENT)
Age: 39
End: 2024-10-25

## 2024-12-06 DIAGNOSIS — R94.39 ABNORMAL RESULT OF OTHER CARDIOVASCULAR FUNCTION STUDY: ICD-10-CM

## 2024-12-13 ENCOUNTER — APPOINTMENT (OUTPATIENT)
Dept: HEART AND VASCULAR | Facility: CLINIC | Age: 39
End: 2024-12-13
Payer: MEDICAID

## 2024-12-13 ENCOUNTER — NON-APPOINTMENT (OUTPATIENT)
Age: 39
End: 2024-12-13

## 2024-12-13 VITALS
DIASTOLIC BLOOD PRESSURE: 82 MMHG | HEART RATE: 88 BPM | OXYGEN SATURATION: 98 % | WEIGHT: 228 LBS | BODY MASS INDEX: 40.4 KG/M2 | HEIGHT: 63 IN | SYSTOLIC BLOOD PRESSURE: 120 MMHG

## 2024-12-13 DIAGNOSIS — E78.5 HYPERLIPIDEMIA, UNSPECIFIED: ICD-10-CM

## 2024-12-13 PROCEDURE — 93000 ELECTROCARDIOGRAM COMPLETE: CPT

## 2024-12-13 PROCEDURE — 99213 OFFICE O/P EST LOW 20 MIN: CPT | Mod: 25

## 2025-03-03 ENCOUNTER — APPOINTMENT (OUTPATIENT)
Dept: GASTROENTEROLOGY | Facility: CLINIC | Age: 40
End: 2025-03-03

## 2025-03-27 ENCOUNTER — APPOINTMENT (OUTPATIENT)
Dept: GASTROENTEROLOGY | Facility: CLINIC | Age: 40
End: 2025-03-27